# Patient Record
Sex: MALE | Race: WHITE | Employment: FULL TIME | URBAN - METROPOLITAN AREA
[De-identification: names, ages, dates, MRNs, and addresses within clinical notes are randomized per-mention and may not be internally consistent; named-entity substitution may affect disease eponyms.]

---

## 2020-10-31 ENCOUNTER — HOSPITAL ENCOUNTER (EMERGENCY)
Facility: CLINIC | Age: 30
Discharge: HOME | End: 2020-10-31
Attending: FAMILY MEDICINE | Admitting: FAMILY MEDICINE
Payer: OTHER GOVERNMENT

## 2020-10-31 VITALS
RESPIRATION RATE: 20 BRPM | DIASTOLIC BLOOD PRESSURE: 95 MMHG | WEIGHT: 185.7 LBS | TEMPERATURE: 98.6 F | SYSTOLIC BLOOD PRESSURE: 146 MMHG | HEART RATE: 89 BPM | OXYGEN SATURATION: 99 %

## 2020-10-31 DIAGNOSIS — R06.02 SHORTNESS OF BREATH: ICD-10-CM

## 2020-10-31 DIAGNOSIS — R07.1 CHEST PAIN ON BREATHING: ICD-10-CM

## 2020-10-31 PROCEDURE — 99282 EMERGENCY DEPT VISIT SF MDM: CPT | Performed by: FAMILY MEDICINE

## 2020-10-31 PROCEDURE — C9803 HOPD COVID-19 SPEC COLLECT: HCPCS | Performed by: FAMILY MEDICINE

## 2020-10-31 PROCEDURE — 99283 EMERGENCY DEPT VISIT LOW MDM: CPT | Performed by: FAMILY MEDICINE

## 2020-10-31 PROCEDURE — U0003 INFECTIOUS AGENT DETECTION BY NUCLEIC ACID (DNA OR RNA); SEVERE ACUTE RESPIRATORY SYNDROME CORONAVIRUS 2 (SARS-COV-2) (CORONAVIRUS DISEASE [COVID-19]), AMPLIFIED PROBE TECHNIQUE, MAKING USE OF HIGH THROUGHPUT TECHNOLOGIES AS DESCRIBED BY CMS-2020-01-R: HCPCS | Performed by: FAMILY MEDICINE

## 2020-10-31 NOTE — ED PROVIDER NOTES
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  Patient was exposed to 10-15 people at a  last weekend.  He went from the  down to [place] for training this past week.  He was feeling fine until today when he was flying back to [place].  While he was on the flight, he was experiencing some symptoms of motion sickness.  He felt somewhat dizzy.  As he got into the [place], he started to feel short of breath and had some heaviness in his chest.  He had difficulty catching his breath and he pulled off the highway to come to the emergency department.  He denies fever, chills, body aches, loss of taste or smell, vomiting, diarrhea.  He has slight nonproductive cough which he attributes to his smoking.  He feels much better now.    Problem List:  There are no active problems to display for this patient.      History reviewed. No pertinent past medical history.    MEDS: No current outpatient medications on file.      ALLERGIES:  No Known Allergies    History reviewed. No pertinent surgical history.    Social History     Tobacco Use     Smoking status: Current Every Day Smoker     Packs/day: 0.50     Smokeless tobacco: Never Used   Substance Use Topics     Alcohol use: None     Comment: occ     Drug use: Never         Review of Systems   Except as noted in HPI, all other systems were reviewed and are negative    Physical Exam     Vitals were reviewed  Patient Vitals for the past 8 hrs:   BP Temp Temp src Pulse Resp SpO2 Weight   10/31/20 1411 (!) 146/95 98.6  F (37  C)  Oral 89 20 99 % 84.2 kg (185 lb 11.2 oz)     GENERAL APPEARANCE: Alert and oriented x3.  No acute distress  FACE: normal facies  EYES: Pupils are equal  HENT: normal external exam  NECK: no adenopathy or asymmetry  RESP: normal respiratory effort; clear breath sounds bilaterally  CV: regular rate and rhythm; no significant murmurs, gallops or rubs  ABD: soft, no tenderness; no rebound or guarding; bowel sounds are normal  EXT: No calf tenderness or pitting edema  SKIN: no worrisome rash  NEURO: no facial droop; no focal deficits, speech is normal  PSYCH: normal mood and affect      Available Lab/Imaging Results   No results found for this or any previous visit (from the past 24 hour(s)).             Impression     Final diagnoses:   Shortness of breath   Chest pain on breathing         ED Course & Medical Decision Making   Zion Rodriguez is a 30 year old male who presented to the emergency department with acute onset of shortness of breath associated with some chest heaviness and tightness.  Patient had symptoms of dizziness associated with motion sickness from his flight.  He had been exposed to COVID-19 about a week ago at a .  Approximately 10 people from that  tested positive.  He went on to [place] where he was getting some training this past week.  He was feeling fine until his flight home.  On the drive back from the airport to his house he started to develop shortness of breath and chest tightness with what he thinks now may have been a panic attack.  He still does not feel like he is breathing normally but it was not as bad as it was earlier.  Patient has not been tested for COVID-19.  He is not able to return to [facility] until he has been cleared.  Patient is a smoker, but does not have any history of asthma, pneumonia or any other lung disease.  He has no other chronic health problems as comorbidities.  Vital signs are stable upon arrival.  Temp is 98.6, blood pressure slightly  elevated at 146/95, respiratory rate is 20 with 99% oxygen saturation.  Exam was unremarkable.  Patient is clear breath sounds.  He has oximeter at home that he can monitor oxygen saturations.  We will test the patient for COVID-19.  He will be notified if positive and he can get results through [email or web address].  Patient will monitor oxygen saturation and temperature and return to the ED if symptoms worsen.  He is medically cleared for discharge home.        Written after-visit summary and instructions were given at the time of discharge.    Follow up Plan:   [facility]  [address] [place] [id number] [phone]    If symptoms worsen      Discharge Instructions:   We are awaiting results of the Covid 19 PCR test.  The results will be posted to my chart.  If your result is positive, you will be contacted by the [place] [facility] as well as one of our nurses.  Monitor fever and oxygen saturations at home.  Continue supportive measures.  Isolate yourself until you know for sure whether you have the infection or not.       Disclaimer: This note consists of words and symbols derived from keyboarding and dictation using voice recognition software.  As a result, there may be errors that have gone undetected.  Please consider this when interpreting information found in this note.       King Whitfield MD  10/31/20 5251

## 2020-10-31 NOTE — ED TRIAGE NOTES
Comes in with some shortness of breath that comes and goes. States has been exposed to people positive for COVID

## 2020-10-31 NOTE — DISCHARGE INSTRUCTIONS
We are awaiting results of the Covid 19 PCR test.  The results will be posted to my chart.  If your result is positive, you will be contacted by the [place] [facility] as well as one of our nurses.  Monitor fever and oxygen saturations at home.  Continue supportive measures.  Isolate yourself until you know for sure whether you have the infection or not.

## 2020-10-31 NOTE — ED AVS SNAPSHOT
[facility]  [address] [place] [address]  Phone: [phone]  Fax: [phone]                                    Zion Rodriguez   MRN: [id number]    Department: [facility] Emergency Dept   Date of Visit: 10/31/2020           After Visit Summary Signature Page    I have received my discharge instructions, and my questions have been answered. I have discussed any challenges I see with this plan with the nurse or doctor.    ..........................................................................................................................................  Patient/Patient Representative Signature      ..........................................................................................................................................  Patient Representative Print Name and Relationship to Patient    ..................................................               ................................................  Date                                   Time    ..........................................................................................................................................  Reviewed by Signature/Title    ...................................................              ..............................................  Date                                               Time          22EPIC Rev 08/18

## 2020-11-01 LAB
SARS-COV-2 RNA SPEC QL NAA+PROBE: NOT DETECTED
SPECIMEN SOURCE: NORMAL

## 2020-11-14 ENCOUNTER — HEALTH MAINTENANCE LETTER (OUTPATIENT)
Age: 30
End: 2020-11-14

## 2021-09-12 ENCOUNTER — HEALTH MAINTENANCE LETTER (OUTPATIENT)
Age: 31
End: 2021-09-12

## 2022-01-02 ENCOUNTER — HEALTH MAINTENANCE LETTER (OUTPATIENT)
Age: 32
End: 2022-01-02

## 2022-11-19 ENCOUNTER — HEALTH MAINTENANCE LETTER (OUTPATIENT)
Age: 32
End: 2022-11-19

## 2023-04-09 ENCOUNTER — HEALTH MAINTENANCE LETTER (OUTPATIENT)
Age: 33
End: 2023-04-09